# Patient Record
Sex: FEMALE | Race: BLACK OR AFRICAN AMERICAN
[De-identification: names, ages, dates, MRNs, and addresses within clinical notes are randomized per-mention and may not be internally consistent; named-entity substitution may affect disease eponyms.]

---

## 2018-08-18 ENCOUNTER — HOSPITAL ENCOUNTER (INPATIENT)
Dept: HOSPITAL 50 - VM.ED | Age: 73
LOS: 2 days | Discharge: HOME HEALTH | DRG: 293 | End: 2018-08-20
Attending: INTERNAL MEDICINE | Admitting: INTERNAL MEDICINE
Payer: MEDICAID

## 2018-08-18 DIAGNOSIS — I50.9: Primary | ICD-10-CM

## 2018-08-18 DIAGNOSIS — I34.0: ICD-10-CM

## 2018-08-18 DIAGNOSIS — F32.9: ICD-10-CM

## 2018-08-18 DIAGNOSIS — Z88.8: ICD-10-CM

## 2018-08-18 DIAGNOSIS — J84.10: ICD-10-CM

## 2018-08-18 DIAGNOSIS — I11.0: ICD-10-CM

## 2018-08-18 DIAGNOSIS — Z79.899: ICD-10-CM

## 2018-08-18 DIAGNOSIS — E87.6: ICD-10-CM

## 2018-08-18 DIAGNOSIS — G93.89: ICD-10-CM

## 2018-08-18 DIAGNOSIS — Z60.3: ICD-10-CM

## 2018-08-18 DIAGNOSIS — D56.3: ICD-10-CM

## 2018-08-18 DIAGNOSIS — E83.52: ICD-10-CM

## 2018-08-18 DIAGNOSIS — I45.10: ICD-10-CM

## 2018-08-18 DIAGNOSIS — I50.23: ICD-10-CM

## 2018-08-18 DIAGNOSIS — R26.9: ICD-10-CM

## 2018-08-18 DIAGNOSIS — M17.0: ICD-10-CM

## 2018-08-18 DIAGNOSIS — Z79.82: ICD-10-CM

## 2018-08-18 DIAGNOSIS — R76.8: ICD-10-CM

## 2018-08-18 LAB
ANION GAP SERPL CALC-SCNC: 13.7 MMOL/L (ref 10–20)
CHLORIDE SERPL-SCNC: 104 MMOL/L (ref 98–107)
SODIUM SERPL-SCNC: 140 MMOL/L (ref 136–145)

## 2018-08-18 RX ADMIN — Medication SCH MG: at 21:16

## 2018-08-18 RX ADMIN — Medication PRN ML: at 20:11

## 2018-08-18 RX ADMIN — Medication PRN ML: at 17:16

## 2018-08-18 SDOH — SOCIAL STABILITY - SOCIAL INSECURITY: ACCULTURATION DIFFICULTY: Z60.3

## 2018-08-18 NOTE — EDM.PDOC
ED HPI GENERAL MEDICAL PROBLEM





- General


Stated Complaint: WEAKNESS


Time Seen by Provider: 08/18/18 12:15


Source of Information: Reports: Patient


History Limitations: Reports: No Limitations





- History of Present Illness


INITIAL COMMENTS - FREE TEXT/NARRATIVE: 





Patient comes into the emergency department with family with complaints of 

weakness bilateral lower extremity. Pt does not speak English-granddaughter is 

here interpreting. Did try to obtain interpretative services over the phone and 

via [facility] however unsuccessful for the patients [place] language and they 

do not have any registered providers. The grandmother states the patient was 

ambulating on her own yesterday however today she's been unable to ambulate. 

When patient tries to stand she begins to shake excessively on her lower 

extremities and cannot move forward. The grand daughter has noticed she has not 

b been drinking as much over the past couple days but the pt has not mentioned 

any reason why. Family/patient denies her falling or being injured in any way. 

She denies any fever, dizziness, lightheadedness, blurred vision, chest pain, 

or shortness of breath. She also denies any recent travel or numbness and 

tingling.  


Onset: Sudden, Gradual


Improves with: Reports: None


Worsens with: Reports: None





- Related Data


 Allergies











Allergy/AdvReac Type Severity Reaction Status Date / Time


 


lisinopril Allergy  Cannot Verified 08/18/18 13:01





   Remember  











Home Meds: 


 Home Meds





ARIPiprazole [Abilify] 5 mg PO DAILY 08/18/18 [History]


Aspirin [Halfprin] 81 mg PO DAILY 08/18/18 [History]


Carvedilol 6.25 mg PO BID 08/18/18 [History]


Cholecalciferol (Vitamin D3) [Vitamin D3] 1 tab PO DAILY 08/18/18 [History]


Citalopram [Citalopram HBr] 1 tab PO DAILY 08/18/18 [History]


Furosemide 20 mg PO DAILY 08/18/18 [History]


Losartan Potassium 50 mg PO DAILY 08/18/18 [History]


Multivitamin [Multivitamins] 1 caplet PO DAILY 08/18/18 [History]


Nintedanib Esylate [Ofev] 150 mg PO BID 08/18/18 [History]


Olopatadine HCl [Pazeo] 1 drop EYEBOTH DAILY 08/18/18 [History]


Potassium Chloride 1 tab PO DAILY 08/18/18 [History]











ED ROS GENERAL





- Review of Systems


Review Of Systems: See Below


Constitutional: Reports: Weakness.  Denies: Fever, Chills, Malaise, Fatigue, 

Night Sweats, Diaphoresis, Decreased Appetite, Weight Loss, Weight Gain


HEENT: Reports: No Symptoms


Respiratory: Reports: No Symptoms


Cardiovascular: Reports: No Symptoms


Endocrine: Reports: No Symptoms


GI/Abdominal: Reports: No Symptoms


: Reports: No Symptoms


Musculoskeletal: Reports: No Symptoms


Skin: Reports: No Symptoms


Neurological: Reports: No Symptoms


Psychiatric: Reports: No Symptoms


Hematologic/Lymphatic: Reports: No Symptoms


Immunologic: Reports: No Symptoms





ED EXAM, GENERAL





- Physical Exam


Exam: See Below


Exam Limited By: Language Barrier


General Appearance: Alert, No Apparent Distress


Eye Exam: Bilateral Eye: PERRL


Head: Atraumatic, Normocephalic


Neck: Normal Inspection, Supple, Non-Tender, Full Range of Motion


Respiratory/Chest: No Respiratory Distress, Lungs Clear, Normal Breath Sounds, 

No Accessory Muscle Use, Chest Non-Tender


Cardiovascular: Normal Peripheral Pulses, Regular Rate, Rhythm, No Edema


Back Exam: Normal Inspection, Full Range of Motion


Extremities: Normal Inspection, Normal Range of Motion, Non-Tender, No Pedal 

Edema.  No: Increased Warmth, Mottled, Pallor, Redness


Neurological: Alert, Abnormal Gait, Other (Pt unable to understand sensory 

motor exam).  No: Normal Gait


Psychiatric: Flat Affect (pt shows no sign of emotion or facial flucuation when 

talking with granddaughter)


Skin Exam: Warm, Dry, Normal Color





EKG INTERPRETATION


EKG Date: 08/18/18


Rhythm: NSR (with PVC)


ST-T: Normal


QT: Normal


Comparison: NA - No Prior EKG





Course





- Vital Signs


Last Recorded V/S: 


 Last Vital Signs











Temp  37.0 C   08/18/18 12:15


 


Pulse  87   08/18/18 12:15


 


Resp  16   08/18/18 12:15


 


BP  145/97 H  08/18/18 12:15


 


Pulse Ox  95   08/18/18 12:15














- Orders/Labs/Meds


Orders: 


 Active Orders 24 hr











 Category Date Time Status


 


 Admission Status [Patient Status] [ADT] Routine ADT  08/18/18 14:24 Active


 


 EKG Documentation Completion [RC] STAT Care  08/18/18 12:27 Active


 


 Chest 1V Frontal [CR] Stat Exams  08/18/18 13:45 Taken


 


 Knee 1V or 2V Bi [CR] Stat Exams  08/18/18 12:37 Taken


 


 Pelvis 1V or 2V [CR] Stat Exams  08/18/18 12:37 Taken


 


 UA W/MICROSCOPIC [URIN] Stat Lab  08/18/18 13:04 Ordered


 


 Sodium Chloride 0.9% [Saline Flush] Med  08/18/18 13:10 Active





 10 ml FLUSH ASDIRECTED PRN   


 


 Peripheral IV Insertion Adult [OM.PC] Stat Oth  08/18/18 13:09 Ordered








 Medication Orders





Sodium Chloride (Saline Flush)  10 ml FLUSH ASDIRECTED PRN


   PRN Reason: Keep Vein Open








Labs: 


 Laboratory Tests











  08/18/18 08/18/18 08/18/18 Range/Units





  12:42 12:42 12:42 


 


WBC  7.5    (4.0-10.0)  x10^3/uL


 


RBC  5.84 H    (4.00-5.50)  x10^6/uL


 


Hgb  12.7    (12.0-16.0)  g/dL


 


Hct  36.6    (33.0-47.0)  %


 


MCV  62.7 L    (78.0-93.0)  fL


 


MCH  21.7 L    (26.0-32.0)  pg


 


MCHC  34.7    (32.0-36.0)  g/dL


 


RDW Coeff of Mary  19.6 H    (10.0-15.0)  %


 


Plt Count  228    (130-400)  x10^3/uL


 


Neut % (Auto)  63.7    (50.0-80.0)  %


 


Lymph % (Auto)  25.9    (25.0-50.0)  %


 


Mono % (Auto)  8.6    (2.0-11.0)  %


 


Eos % (Auto)  1.7    (0.0-4.0)  %


 


Baso % (Auto)  0.1 L    (0.2-1.2)  %


 


Sodium   140   (136-145)  mmol/L


 


Potassium   3.7   (3.5-5.1)  mmol/L


 


Chloride   104   ()  mmol/L


 


Carbon Dioxide   26   (21-32)  mmol/L


 


Anion Gap   13.7   (10-20)  mmol/L


 


BUN   27 H   (7-18)  mg/dL


 


Creatinine   1.0   (0.55-1.02)  mg/dL


 


Est Cr Clr Drug Dosing   35.45   mL/min


 


Estimated GFR (MDRD)   54   


 


Glucose   111 H   ()  mg/dL


 


Calcium   9.7   (8.5-10.1)  mg/dL


 


Corrected Calcium   10.18 H   (8.5-10.1)  mg/dL


 


Total Bilirubin   0.7   (0.2-1.0)  mg/dL


 


AST   22   (15-37)  U/L


 


ALT   16   (14-59)  U/L


 


Alkaline Phosphatase   73   ()  U/L


 


Creatine Kinase    167  ()  U/L


 


NT-Pro-B Natriuret Pep   2905 H   (<=125)  pg/mL


 


Total Protein   9.0 H   (6.4-8.2)  g/dL


 


Albumin   3.4   (3.4-5.0)  g/dL


 


Globulin   5.6   


 


Albumin/Globulin Ratio   0.61   


 


Urine Color     (YELLOW)  


 


Urine Appearance     (CLEAR)  


 


Urine pH     (5.0-8.0)  


 


Ur Specific Gravity     


 


Urine Protein     (NEGATIVE)  mg/dL


 


Urine Glucose (UA)     (NEGATIVE)  mg/dL


 


Urine Ketones     (NEGATIVE)  mg/dL


 


Urine Occult Blood     (NEGATIVE)  


 


Urine Nitrite     (NEGATIVE)  


 


Urine Bilirubin     (NEGATIVE)  


 


Urine Urobilinogen     (0.2)  EU/dL


 


Ur Leukocyte Esterase     (NEGATIVE)  


 


Urine RBC     (NOT SEEN)  /HPF


 


Urine WBC     (NOT SEEN)  /HPF


 


Ur Squamous Epith Cells     (NEGATIVE)  /HPF


 


Urine Bacteria     (NEGATIVE)  /HPF


 


Urine Mucus     (NEGATIVE)  /LPF














  08/18/18 Range/Units





  13:04 


 


WBC   (4.0-10.0)  x10^3/uL


 


RBC   (4.00-5.50)  x10^6/uL


 


Hgb   (12.0-16.0)  g/dL


 


Hct   (33.0-47.0)  %


 


MCV   (78.0-93.0)  fL


 


MCH   (26.0-32.0)  pg


 


MCHC   (32.0-36.0)  g/dL


 


RDW Coeff of Mary   (10.0-15.0)  %


 


Plt Count   (130-400)  x10^3/uL


 


Neut % (Auto)   (50.0-80.0)  %


 


Lymph % (Auto)   (25.0-50.0)  %


 


Mono % (Auto)   (2.0-11.0)  %


 


Eos % (Auto)   (0.0-4.0)  %


 


Baso % (Auto)   (0.2-1.2)  %


 


Sodium   (136-145)  mmol/L


 


Potassium   (3.5-5.1)  mmol/L


 


Chloride   ()  mmol/L


 


Carbon Dioxide   (21-32)  mmol/L


 


Anion Gap   (10-20)  mmol/L


 


BUN   (7-18)  mg/dL


 


Creatinine   (0.55-1.02)  mg/dL


 


Est Cr Clr Drug Dosing   mL/min


 


Estimated GFR (MDRD)   


 


Glucose   ()  mg/dL


 


Calcium   (8.5-10.1)  mg/dL


 


Corrected Calcium   (8.5-10.1)  mg/dL


 


Total Bilirubin   (0.2-1.0)  mg/dL


 


AST   (15-37)  U/L


 


ALT   (14-59)  U/L


 


Alkaline Phosphatase   ()  U/L


 


Creatine Kinase   ()  U/L


 


NT-Pro-B Natriuret Pep   (<=125)  pg/mL


 


Total Protein   (6.4-8.2)  g/dL


 


Albumin   (3.4-5.0)  g/dL


 


Globulin   


 


Albumin/Globulin Ratio   


 


Urine Color  Yellow  (YELLOW)  


 


Urine Appearance  Clear  (CLEAR)  


 


Urine pH  7.0  (5.0-8.0)  


 


Ur Specific Gravity  1.020  


 


Urine Protein  >=300 H  (NEGATIVE)  mg/dL


 


Urine Glucose (UA)  Negative  (NEGATIVE)  mg/dL


 


Urine Ketones  Negative  (NEGATIVE)  mg/dL


 


Urine Occult Blood  Trace-intact H  (NEGATIVE)  


 


Urine Nitrite  Negative  (NEGATIVE)  


 


Urine Bilirubin  Negative  (NEGATIVE)  


 


Urine Urobilinogen  0.2  (0.2)  EU/dL


 


Ur Leukocyte Esterase  Trace H  (NEGATIVE)  


 


Urine RBC  0-5  (NOT SEEN)  /HPF


 


Urine WBC  0-5  (NOT SEEN)  /HPF


 


Ur Squamous Epith Cells  Few H  (NEGATIVE)  /HPF


 


Urine Bacteria  Not seen  (NEGATIVE)  /HPF


 


Urine Mucus  Not seen  (NEGATIVE)  /LPF











Meds: 


Medications











Generic Name Dose Route Start Last Admin





  Trade Name Freq  PRN Reason Stop Dose Admin


 


Sodium Chloride  10 ml  08/18/18 13:10  





  Saline Flush  FLUSH   





  ASDIRECTED PRN   





  Keep Vein Open   





     





     





     














Discontinued Medications














Generic Name Dose Route Start Last Admin





  Trade Name Freq  PRN Reason Stop Dose Admin


 


Sodium Chloride  1,000 mls @ 1,000 mls/hr  08/18/18 13:10  08/18/18 13:19





  Normal Saline  IV  08/18/18 14:09  1,000 mls/hr





  ONETIME ONE   Administration





     





     





     





     














Departure





- Departure


Time of Disposition: 14:20


Disposition: Admitted As Inpatient 65


Clinical Impression: 


CHF (congestive heart failure)


Qualifiers:


 Heart failure type: unspecified Heart failure chronicity: acute Qualified Code(

s): I50.9 - Heart failure, unspecified








- Discharge Information


Referrals: 


PCP,Not In Area [Primary Care Provider] - 





- My Orders


Last 24 Hours: 


My Active Orders





08/18/18 12:27


EKG Documentation Completion [RC] STAT 





08/18/18 12:37


Knee 1V or 2V Bi [CR] Stat 


Pelvis 1V or 2V [CR] Stat 





08/18/18 13:04


UA W/MICROSCOPIC [URIN] Stat 





08/18/18 13:09


Peripheral IV Insertion Adult [OM.PC] Stat 





08/18/18 13:10


Sodium Chloride 0.9% [Saline Flush]   10 ml FLUSH ASDIRECTED PRN 





08/18/18 13:45


Chest 1V Frontal [CR] Stat 





08/18/18 14:24


Admission Status [Patient Status] [ADT] Routine 














- Assessment/Plan


Last 24 Hours: 


My Active Orders





08/18/18 12:27


EKG Documentation Completion [RC] STAT 





08/18/18 12:37


Knee 1V or 2V Bi [CR] Stat 


Pelvis 1V or 2V [CR] Stat 





08/18/18 13:04


UA W/MICROSCOPIC [URIN] Stat 





08/18/18 13:09


Peripheral IV Insertion Adult [OM.PC] Stat 





08/18/18 13:10


Sodium Chloride 0.9% [Saline Flush]   10 ml FLUSH ASDIRECTED PRN 





08/18/18 13:45


Chest 1V Frontal [CR] Stat 





08/18/18 14:24


Admission Status [Patient Status] [ADT] Routine 











Assessment:: 





1. weakness


2. unable to ambulate on own 


Plan: 





1. labs completed in ER results discussed with the pt and family


2. xray completed in ER results discussed with the pt and family 


3. UA completed in ER 


4. IV with fluids for possible dehydration. After labs were returned rate was 

turned down to 125ml/hr


5. Chest x-ray results discussed with the pt and family 


6. Discussed case with hospitalist Dr. Robert who has agreed to admit pt under 

acute care.

## 2018-08-18 NOTE — PCM.HP
H&P History of Present Illness





- General


Date of Service: 08/18/18


Admit Problem/Dx: 


 Admission Diagnosis/Problem





Admission Diagnosis/Problem      CHF, Congestive heart failure








Source of Information: Patient, Old Records


History Limitations: Reports: Language Barrier





- History of Present Illness


Initial Comments - Free Text/Narative: 





CC Weakness


Pt is 74 year old [place] female who has been visiting her granddaughter in  

for a week. Pt's English is severely limited and hx is obtained through 

Granddaughter's translation. The patient has been living in [place] with her 

daughter. She is at baseline able to walk w/o a walker, do ADL's, cook and 

clean. Granddaughter knows of a hx of depression and some lung problems. Pt was 

in her usual state of health until she was found this afternoon on her hands 

and knees, unable to get up because of weakness. Pt herself says the weakness 

started the night before admision. She denies any pain in legs, back or head, 

numbness or tingling, just that she is weak.


She denies any chess pain or SOB


Pt was found unable to transfer w/o 2 assisting in the  ER while her strength 

in her legs was more or less intact. CXR and BNP were consistent with CHF and 

pt is being admitted for diuresis and observation and treatment of her weakness.


Other HPI/Comments: 





problem2: weakness





- Related Data


Allergies/Adverse Reactions: 


 Allergies











Allergy/AdvReac Type Severity Reaction Status Date / Time


 


lisinopril Allergy  Cannot Verified 08/18/18 13:01





   Remember  











Home Medications: 


 Home Meds





ARIPiprazole [Abilify] 5 mg PO DAILY 08/18/18 [History]


Aspirin [Halfprin] 81 mg PO DAILY 08/18/18 [History]


Carvedilol 6.25 mg PO BID 08/18/18 [History]


Cholecalciferol (Vitamin D3) [Vitamin D3] 1 tab PO DAILY 08/18/18 [History]


Citalopram [Citalopram HBr] 1 tab PO DAILY 08/18/18 [History]


Furosemide 20 mg PO DAILY 08/18/18 [History]


Losartan Potassium 50 mg PO DAILY 08/18/18 [History]


Multivitamin [Multivitamins] 1 caplet PO DAILY 08/18/18 [History]


Nintedanib Esylate [Ofev] 150 mg PO BID 08/18/18 [History]


Olopatadine HCl [Pazeo] 1 drop EYEBOTH DAILY 08/18/18 [History]


Potassium Chloride 1 tab PO DAILY 08/18/18 [History]











Past Medical History


Cardiovascular History: Reports: Heart Failure (EF 45% with lateral wall 

hypokinesis, mod MR 3/2018), Hypertension


Respiratory History: Reports: Other (See Below) (treated for pulomnary fibrosis 

at [place])


Musculoskeletal History: Reports: RA


Psychiatric History: Reports: Depression (unable to get hx as to why she is on 

abilify)





Social & Family History





- Family History


Family Medical History: Unobtainable





- Tobacco Use


Smoking Status *Q: Never Smoker





- Living Situation & Occupation


Living situation: Reports: with Family


Occupation: Unemployed





H&P Review of Systems





- Review of Systems:


Review Of Systems: See Below


General: Reports: No Symptoms


Pulmonary: Reports: No Symptoms


Cardiovascular: Reports: No Symptoms


Gastrointestinal: Reports: No Symptoms


Genitourinary: Reports: No Symptoms


Musculoskeletal: Reports: No Symptoms


Psychiatric: Reports: No Symptoms


Neurological: Reports: Gait Disturbance





Exam





- Exam


Exam: See Below





- Vital Signs


Vital Signs: 


 Last Vital Signs











Temp  98.5 F   08/18/18 17:39


 


Pulse  80   08/18/18 20:11


 


Resp  20   08/18/18 17:39


 


BP  151/85 H  08/18/18 20:11


 


Pulse Ox  99   08/18/18 17:39











Weight: 124 lb 13 oz





- Exam


General: Alert, Cooperative (limited cooperatio because of language difficulty)


HEENT: Conjunctiva Clear, Mucosa Moist & Pink


Neck: Supple


Lungs: Clear to Auscultation


Cardiovascular: Regular Rate


GI/Abdominal Exam: Normal Bowel Sounds, Soft, Non-Tender


Back Exam: Normal Inspection (no back tenderness)


Extremities: Normal Inspection, No Pedal Edema


Skin: Warm, Dry, Intact


Neurological: Cranial Nerves Intact, Sensation Intact (intact to fine touch, 

after repated coaching, hip, knee and ankle stength appers dunia in tact but pt 

unable to stand on her own and walk even 2 steps )





- Patient Data


Lab Results Last 24 hrs: 


 Laboratory Results - last 24 hr











  08/18/18 08/18/18 08/18/18 Range/Units





  12:42 12:42 12:42 


 


WBC  7.5    (4.0-10.0)  x10^3/uL


 


RBC  5.84 H    (4.00-5.50)  x10^6/uL


 


Hgb  12.7    (12.0-16.0)  g/dL


 


Hct  36.6    (33.0-47.0)  %


 


MCV  62.7 L    (78.0-93.0)  fL


 


MCH  21.7 L    (26.0-32.0)  pg


 


MCHC  34.7    (32.0-36.0)  g/dL


 


RDW Coeff of Mary  19.6 H    (10.0-15.0)  %


 


Plt Count  228    (130-400)  x10^3/uL


 


Neut % (Auto)  63.7    (50.0-80.0)  %


 


Lymph % (Auto)  25.9    (25.0-50.0)  %


 


Mono % (Auto)  8.6    (2.0-11.0)  %


 


Eos % (Auto)  1.7    (0.0-4.0)  %


 


Baso % (Auto)  0.1 L    (0.2-1.2)  %


 


Sodium   140   (136-145)  mmol/L


 


Potassium   3.7   (3.5-5.1)  mmol/L


 


Chloride   104   ()  mmol/L


 


Carbon Dioxide   26   (21-32)  mmol/L


 


Anion Gap   13.7   (10-20)  mmol/L


 


BUN   27 H   (7-18)  mg/dL


 


Creatinine   1.0   (0.55-1.02)  mg/dL


 


Est Cr Clr Drug Dosing   35.45   mL/min


 


Estimated GFR (MDRD)   54   


 


Glucose   111 H   ()  mg/dL


 


Calcium   9.7   (8.5-10.1)  mg/dL


 


Corrected Calcium   10.18 H   (8.5-10.1)  mg/dL


 


Total Bilirubin   0.7   (0.2-1.0)  mg/dL


 


AST   22   (15-37)  U/L


 


ALT   16   (14-59)  U/L


 


Alkaline Phosphatase   73   ()  U/L


 


Creatine Kinase    167  ()  U/L


 


Troponin I     (<=0.056)  ng/mL


 


NT-Pro-B Natriuret Pep   2905 H   (<=125)  pg/mL


 


Total Protein   9.0 H   (6.4-8.2)  g/dL


 


Albumin   3.4   (3.4-5.0)  g/dL


 


Globulin   5.6   


 


Albumin/Globulin Ratio   0.61   


 


Urine Color     (YELLOW)  


 


Urine Appearance     (CLEAR)  


 


Urine pH     (5.0-8.0)  


 


Ur Specific Gravity     


 


Urine Protein     (NEGATIVE)  mg/dL


 


Urine Glucose (UA)     (NEGATIVE)  mg/dL


 


Urine Ketones     (NEGATIVE)  mg/dL


 


Urine Occult Blood     (NEGATIVE)  


 


Urine Nitrite     (NEGATIVE)  


 


Urine Bilirubin     (NEGATIVE)  


 


Urine Urobilinogen     (0.2)  EU/dL


 


Ur Leukocyte Esterase     (NEGATIVE)  


 


Urine RBC     (NOT SEEN)  /HPF


 


Urine WBC     (NOT SEEN)  /HPF


 


Ur Squamous Epith Cells     (NEGATIVE)  /HPF


 


Urine Bacteria     (NEGATIVE)  /HPF


 


Urine Mucus     (NEGATIVE)  /LPF














  08/18/18 08/18/18 Range/Units





  12:42 13:04 


 


WBC    (4.0-10.0)  x10^3/uL


 


RBC    (4.00-5.50)  x10^6/uL


 


Hgb    (12.0-16.0)  g/dL


 


Hct    (33.0-47.0)  %


 


MCV    (78.0-93.0)  fL


 


MCH    (26.0-32.0)  pg


 


MCHC    (32.0-36.0)  g/dL


 


RDW Coeff of Mary    (10.0-15.0)  %


 


Plt Count    (130-400)  x10^3/uL


 


Neut % (Auto)    (50.0-80.0)  %


 


Lymph % (Auto)    (25.0-50.0)  %


 


Mono % (Auto)    (2.0-11.0)  %


 


Eos % (Auto)    (0.0-4.0)  %


 


Baso % (Auto)    (0.2-1.2)  %


 


Sodium    (136-145)  mmol/L


 


Potassium    (3.5-5.1)  mmol/L


 


Chloride    ()  mmol/L


 


Carbon Dioxide    (21-32)  mmol/L


 


Anion Gap    (10-20)  mmol/L


 


BUN    (7-18)  mg/dL


 


Creatinine    (0.55-1.02)  mg/dL


 


Est Cr Clr Drug Dosing    mL/min


 


Estimated GFR (MDRD)    


 


Glucose    ()  mg/dL


 


Calcium    (8.5-10.1)  mg/dL


 


Corrected Calcium    (8.5-10.1)  mg/dL


 


Total Bilirubin    (0.2-1.0)  mg/dL


 


AST    (15-37)  U/L


 


ALT    (14-59)  U/L


 


Alkaline Phosphatase    ()  U/L


 


Creatine Kinase    ()  U/L


 


Troponin I  < 0.017   (<=0.056)  ng/mL


 


NT-Pro-B Natriuret Pep    (<=125)  pg/mL


 


Total Protein    (6.4-8.2)  g/dL


 


Albumin    (3.4-5.0)  g/dL


 


Globulin    


 


Albumin/Globulin Ratio    


 


Urine Color   Yellow  (YELLOW)  


 


Urine Appearance   Clear  (CLEAR)  


 


Urine pH   7.0  (5.0-8.0)  


 


Ur Specific Gravity   1.020  


 


Urine Protein   >=300 H  (NEGATIVE)  mg/dL


 


Urine Glucose (UA)   Negative  (NEGATIVE)  mg/dL


 


Urine Ketones   Negative  (NEGATIVE)  mg/dL


 


Urine Occult Blood   Trace-intact H  (NEGATIVE)  


 


Urine Nitrite   Negative  (NEGATIVE)  


 


Urine Bilirubin   Negative  (NEGATIVE)  


 


Urine Urobilinogen   0.2  (0.2)  EU/dL


 


Ur Leukocyte Esterase   Trace H  (NEGATIVE)  


 


Urine RBC   0-5  (NOT SEEN)  /HPF


 


Urine WBC   0-5  (NOT SEEN)  /HPF


 


Ur Squamous Epith Cells   Few H  (NEGATIVE)  /HPF


 


Urine Bacteria   Not seen  (NEGATIVE)  /HPF


 


Urine Mucus   Not seen  (NEGATIVE)  /LPF











Result Diagrams: 


 08/18/18 12:42





 08/18/18 12:42





EKG INTERPRETATION


Rhythm: NSR


Axis: LAD-Left Axis Deviation


QRS: RBBB





- Problem List


(1) CHF (congestive heart failure)


SNOMED Code(s): 17379679


   ICD Code: I50.9 - HEART FAILURE, UNSPECIFIED   Status: Acute   Current Visit

: Yes   


Qualifiers: 


   Heart failure type: unspecified   Heart failure chronicity: acute on chronic

   Qualified Code(s): I50.9 - Heart failure, unspecified   


Problem List Initiated/Reviewed/Updated: Yes


Orders Last 24hrs: 


 Active Orders 24 hr











 Category Date Time Status


 


 Patient Status [ADT] Routine ADT  08/18/18 14:51 Active


 


 Cardiac Monitoring [RC] 02,06,10,14,18,22 Care  08/18/18 14:52 Active


 


 Communication Order [RC] ROUTINE Care  08/18/18 15:05 Active


 


 EKG Documentation Completion [RC] STAT Care  08/18/18 12:27 Active


 


 Height and Weight [RC] 07 Care  08/18/18 14:51 Active


 


 Intake and Output [RC] 06,18 Care  08/18/18 14:52 Active


 


 Oxygen Therapy [RC] .PRN Care  08/18/18 14:51 Active


 


 Up With Assistance [RC] 08,20 Care  08/18/18 14:51 Active


 


 VTE/DVT Education [RC] .PRN Care  08/18/18 14:51 Active


 


 Vital Signs [RC] 02,06,10,14,18,22 Care  08/18/18 14:51 Active


 


 2 Gram Sodium Diet [DIET] Diet  08/18/18 Dinner Active


 


 Chest 1V Frontal [CR] Stat Exams  08/18/18 13:45 Taken


 


 Head wo Cont [CT] Routine Exams  08/18/18 15:02 Taken


 


 Knee 1V or 2V Bi [CR] Stat Exams  08/18/18 12:37 Taken


 


 Pelvis 1V or 2V [CR] Stat Exams  08/18/18 12:37 Taken


 


 BASIC METABOLIC PANEL,BMP [CHEM] AM Lab  08/19/18 05:11 Ordered


 


 COMPREHENSIVE METABOLIC PN,CMP [CHEM] Routine Lab  08/19/18 05:11 Ordered


 


 MAGNESIUM [CHEM] AM Lab  08/19/18 05:11 Ordered


 


 TROPONIN I [CHEM] AM Lab  08/19/18 05:11 Ordered


 


 UA W/MICROSCOPIC [URIN] Stat Lab  08/18/18 13:04 Ordered


 


 ARIPiprazole [Abilify] Med  08/19/18 08:00 Pending





 5 mg PO DAILY   


 


 Aspirin [Halfprin] Med  08/19/18 08:00 Active





 81 mg PO DAILY   


 


 Carvedilol [Coreg] Med  08/18/18 20:00 Active





 6.25 mg PO BID   


 


 Cholecalciferol (Vitamin D3) [Vitamin D3] Med  08/19/18 08:00 Active





 1,000 units PO DAILY   


 


 Citalopram [Celexa] Med  08/19/18 08:00 Active





 20 mg PO DAILY   


 


 Enoxaparin [Lovenox] Med  08/19/18 08:00 Active





 40 mg SUBCUT DAILY   


 


 Furosemide [Lasix] Med  08/18/18 16:30 Active





 20 mg IV TID   


 


 Losartan [Cozaar] Med  08/19/18 08:00 Active





 50 mg PO DAILY   


 


 Multivitamins with Zinc [Stress Formula with Zinc] Med  08/19/18 08:00 Active





 1 tab PO DAILY   


 


 Nintedanib Esylate [Ofev] Med  08/18/18 20:00 Active





 150 mg PO BID   


 


 Olopatadine HCl [Pazeo] Med  08/19/18 08:00 Pending





 1 drop EYEBOTH DAILY   


 


 Potassium Chloride [Klor-Con 10] Med  08/19/18 08:00 Active





 10 meq PO DAILY   


 


 Sodium Chloride 0.9% [Saline Flush] Med  08/18/18 13:10 Active





 10 ml FLUSH ASDIRECTED PRN   


 


 Sodium Chloride 0.9% [Saline Flush] Med  08/18/18 14:51 Active





 10 ml FLUSH ASDIRECTED PRN   


 


 Peripheral IV Insertion Adult [OM.PC] Stat Oth  08/18/18 13:09 Ordered


 


 Saline Lock Insert [OM.PC] Routine Oth  08/18/18 14:51 Ordered


 


 Resuscitation Status Routine Resus Stat  08/18/18 14:51 Ordered








 Medication Orders





Aspirin (Halfprin)  81 mg PO DAILY [facility]


Carvedilol (Coreg)  6.25 mg PO BID [facility]


   Last Admin: 08/18/18 20:11  Dose: 6.25 mg


Cholecalciferol (Vitamin D3)  1,000 units PO DAILY [facility]


Citalopram Hydrobromide (Celexa)  20 mg PO DAILY [facility]


Enoxaparin Sodium (Lovenox)  40 mg SUBCUT DAILY [facility]


Furosemide (Lasix)  20 mg IV TID [facility]


   Last Admin: 08/18/18 20:11  Dose: 20 mg


   Admin: 08/18/18 17:09  Dose: 20 mg


Losartan Potassium (Cozaar)  50 mg PO DAILY [facility]


Non-Formulary Medication (Aripiprazole [Abilify])  5 mg PO DAILY [facility]


Pt Own Med** ( Nintedanib Esylate [ Ofev] 150 Mg)  150 mg PO BID [facility]


Non-Formulary Medication (Olopatadine Hcl [Pazeo])  1 drop EYEBOTH DAILY [facility]


Potassium Chloride (Klor-Con 10)  10 meq PO DAILY [facility]


Sodium Chloride (Saline Flush)  10 ml FLUSH ASDIRECTED PRN


   PRN Reason: Keep Vein Open


   Last Admin: 08/18/18 20:11  Dose: 10 ml


   Admin: 08/18/18 17:16  Dose: 10 ml


Sodium Chloride (Saline Flush)  10 ml FLUSH ASDIRECTED PRN


   PRN Reason: Keep Vein Open


Vitamin B Complex/Vit C/Vit E/Zinc (Stress Formula With Zinc)  1 tab PO DAILY 

[facility]








Assessment/Plan Comment:: 





BNP has never been as high as present level. Will diures with Iv Lasix amd 

repeat BNP.


problem 2: weakness- etiology of this is unclear, maybe related to CHF


Plain x rays of hip, knees and pelvis neg for acute injury


Cat head shows 12 calcification in an area of encephalomalacia left frontal lobe

, mild to moderate generalized atrophy and chronic small vessel disease. If she 

fails to improve MRI mayb e worthwhile. Also of note is mildly elevated calcium 

and protein, will repeat n AM and if abnormal will need up for hypercalcemia 

and Multiple myeloma


3. Microcytosis: this has been seen previously, may be a thalassemia trait, 

Will hold off on workup since hb is normal, get records from [facility]


4. Depression: continue antidepressant, get records re abilify


5. Pulm Fibrosis: continue present medication, cxr not helpful to determine 

level of CHF.

## 2018-08-19 LAB
ANION GAP SERPL CALC-SCNC: 13.4 MMOL/L (ref 10–20)
CHLORIDE SERPL-SCNC: 105 MMOL/L (ref 98–107)
SODIUM SERPL-SCNC: 142 MMOL/L (ref 136–145)

## 2018-08-19 RX ADMIN — Medication SCH MG: at 08:12

## 2018-08-19 RX ADMIN — Medication PRN ML: at 21:06

## 2018-08-19 RX ADMIN — Medication SCH MG: at 21:03

## 2018-08-19 RX ADMIN — POTASSIUM CHLORIDE SCH MEQ: 750 TABLET, FILM COATED, EXTENDED RELEASE ORAL at 08:11

## 2018-08-19 RX ADMIN — VITAMIN D, TAB 1000IU (100/BT) SCH UNITS: 25 TAB at 08:10

## 2018-08-19 RX ADMIN — ZINC SCH TAB: TAB ORAL at 08:10

## 2018-08-19 NOTE — PCM.PN
- General Info


Date of Service: 08/19/18


Subjective Update: 





Pt unable to voice any complaint because of language barrier but answers ok to 

"How are you today"





- Patient Data


Vitals - Most Recent: 


 Last Vital Signs











Temp  97.0 F   08/19/18 09:38


 


Pulse  75   08/19/18 09:38


 


Resp  16   08/19/18 09:38


 


BP  130/93 H  08/19/18 09:38


 


Pulse Ox  100   08/19/18 09:38











Weight - Most Recent: 123 lb 6.4 oz


I&O - Last 24 Hours: 


 Intake & Output











 08/18/18 08/19/18 08/19/18





 22:59 06:59 14:59


 


Intake Total 200 600 


 


Output Total 1150 1800 


 


Balance -950 -1200 











Lab Results Last 24 Hours: 


 Laboratory Results - last 24 hr











  08/18/18 08/18/18 08/18/18 Range/Units





  12:42 12:42 12:42 


 


WBC  7.5    (4.0-10.0)  x10^3/uL


 


RBC  5.84 H    (4.00-5.50)  x10^6/uL


 


Hgb  12.7    (12.0-16.0)  g/dL


 


Hct  36.6    (33.0-47.0)  %


 


MCV  62.7 L    (78.0-93.0)  fL


 


MCH  21.7 L    (26.0-32.0)  pg


 


MCHC  34.7    (32.0-36.0)  g/dL


 


RDW Coeff of Mary  19.6 H    (10.0-15.0)  %


 


Plt Count  228    (130-400)  x10^3/uL


 


Neut % (Auto)  63.7    (50.0-80.0)  %


 


Lymph % (Auto)  25.9    (25.0-50.0)  %


 


Mono % (Auto)  8.6    (2.0-11.0)  %


 


Eos % (Auto)  1.7    (0.0-4.0)  %


 


Baso % (Auto)  0.1 L    (0.2-1.2)  %


 


Sodium   140   (136-145)  mmol/L


 


Potassium   3.7   (3.5-5.1)  mmol/L


 


Chloride   104   ()  mmol/L


 


Carbon Dioxide   26   (21-32)  mmol/L


 


Anion Gap   13.7   (10-20)  mmol/L


 


BUN   27 H   (7-18)  mg/dL


 


Creatinine   1.0   (0.55-1.02)  mg/dL


 


Est Cr Clr Drug Dosing   35.45   mL/min


 


Estimated GFR (MDRD)   54   


 


Glucose   111 H   ()  mg/dL


 


Calcium   9.7   (8.5-10.1)  mg/dL


 


Corrected Calcium   10.18 H   (8.5-10.1)  mg/dL


 


Magnesium     (1.8-2.4)  mg/dL


 


Total Bilirubin   0.7   (0.2-1.0)  mg/dL


 


AST   22   (15-37)  U/L


 


ALT   16   (14-59)  U/L


 


Alkaline Phosphatase   73   ()  U/L


 


Creatine Kinase    167  ()  U/L


 


Troponin I     (<=0.056)  ng/mL


 


NT-Pro-B Natriuret Pep   2905 H   (<=125)  pg/mL


 


Total Protein   9.0 H   (6.4-8.2)  g/dL


 


Albumin   3.4   (3.4-5.0)  g/dL


 


Globulin   5.6   


 


Albumin/Globulin Ratio   0.61   


 


Urine Color     (YELLOW)  


 


Urine Appearance     (CLEAR)  


 


Urine pH     (5.0-8.0)  


 


Ur Specific Gravity     


 


Urine Protein     (NEGATIVE)  mg/dL


 


Urine Glucose (UA)     (NEGATIVE)  mg/dL


 


Urine Ketones     (NEGATIVE)  mg/dL


 


Urine Occult Blood     (NEGATIVE)  


 


Urine Nitrite     (NEGATIVE)  


 


Urine Bilirubin     (NEGATIVE)  


 


Urine Urobilinogen     (0.2)  EU/dL


 


Ur Leukocyte Esterase     (NEGATIVE)  


 


Urine RBC     (NOT SEEN)  /HPF


 


Urine WBC     (NOT SEEN)  /HPF


 


Ur Squamous Epith Cells     (NEGATIVE)  /HPF


 


Urine Bacteria     (NEGATIVE)  /HPF


 


Urine Mucus     (NEGATIVE)  /LPF














  08/18/18 08/18/18 08/19/18 Range/Units





  12:42 13:04 07:13 


 


WBC     (4.0-10.0)  x10^3/uL


 


RBC     (4.00-5.50)  x10^6/uL


 


Hgb     (12.0-16.0)  g/dL


 


Hct     (33.0-47.0)  %


 


MCV     (78.0-93.0)  fL


 


MCH     (26.0-32.0)  pg


 


MCHC     (32.0-36.0)  g/dL


 


RDW Coeff of Mary     (10.0-15.0)  %


 


Plt Count     (130-400)  x10^3/uL


 


Neut % (Auto)     (50.0-80.0)  %


 


Lymph % (Auto)     (25.0-50.0)  %


 


Mono % (Auto)     (2.0-11.0)  %


 


Eos % (Auto)     (0.0-4.0)  %


 


Baso % (Auto)     (0.2-1.2)  %


 


Sodium    142  (136-145)  mmol/L


 


Potassium    3.4 L  (3.5-5.1)  mmol/L


 


Chloride    105  ()  mmol/L


 


Carbon Dioxide    27  (21-32)  mmol/L


 


Anion Gap    13.4  (10-20)  mmol/L


 


BUN    29 H  (7-18)  mg/dL


 


Creatinine    1.1 H  (0.55-1.02)  mg/dL


 


Est Cr Clr Drug Dosing    32.23  mL/min


 


Estimated GFR (MDRD)    59  


 


Glucose    140 H  ()  mg/dL


 


Calcium    8.9  (8.5-10.1)  mg/dL


 


Corrected Calcium    9.54  (8.5-10.1)  mg/dL


 


Magnesium    1.9  (1.8-2.4)  mg/dL


 


Total Bilirubin    0.7  (0.2-1.0)  mg/dL


 


AST    35  (15-37)  U/L


 


ALT    20  (14-59)  U/L


 


Alkaline Phosphatase    68  ()  U/L


 


Creatine Kinase     ()  U/L


 


Troponin I  < 0.017   0.028  (<=0.056)  ng/mL


 


NT-Pro-B Natriuret Pep     (<=125)  pg/mL


 


Total Protein    8.5 H  (6.4-8.2)  g/dL


 


Albumin    3.2 L  (3.4-5.0)  g/dL


 


Globulin    5.3  


 


Albumin/Globulin Ratio    0.60  


 


Urine Color   Yellow   (YELLOW)  


 


Urine Appearance   Clear   (CLEAR)  


 


Urine pH   7.0   (5.0-8.0)  


 


Ur Specific Gravity   1.020   


 


Urine Protein   >=300 H   (NEGATIVE)  mg/dL


 


Urine Glucose (UA)   Negative   (NEGATIVE)  mg/dL


 


Urine Ketones   Negative   (NEGATIVE)  mg/dL


 


Urine Occult Blood   Trace-intact H   (NEGATIVE)  


 


Urine Nitrite   Negative   (NEGATIVE)  


 


Urine Bilirubin   Negative   (NEGATIVE)  


 


Urine Urobilinogen   0.2   (0.2)  EU/dL


 


Ur Leukocyte Esterase   Trace H   (NEGATIVE)  


 


Urine RBC   0-5   (NOT SEEN)  /HPF


 


Urine WBC   0-5   (NOT SEEN)  /HPF


 


Ur Squamous Epith Cells   Few H   (NEGATIVE)  /HPF


 


Urine Bacteria   Not seen   (NEGATIVE)  /HPF


 


Urine Mucus   Not seen   (NEGATIVE)  /LPF














  08/19/18 Range/Units





  07:13 


 


WBC   (4.0-10.0)  x10^3/uL


 


RBC   (4.00-5.50)  x10^6/uL


 


Hgb   (12.0-16.0)  g/dL


 


Hct   (33.0-47.0)  %


 


MCV   (78.0-93.0)  fL


 


MCH   (26.0-32.0)  pg


 


MCHC   (32.0-36.0)  g/dL


 


RDW Coeff of Mary   (10.0-15.0)  %


 


Plt Count   (130-400)  x10^3/uL


 


Neut % (Auto)   (50.0-80.0)  %


 


Lymph % (Auto)   (25.0-50.0)  %


 


Mono % (Auto)   (2.0-11.0)  %


 


Eos % (Auto)   (0.0-4.0)  %


 


Baso % (Auto)   (0.2-1.2)  %


 


Sodium   (136-145)  mmol/L


 


Potassium   (3.5-5.1)  mmol/L


 


Chloride   ()  mmol/L


 


Carbon Dioxide   (21-32)  mmol/L


 


Anion Gap   (10-20)  mmol/L


 


BUN   (7-18)  mg/dL


 


Creatinine   (0.55-1.02)  mg/dL


 


Est Cr Clr Drug Dosing   mL/min


 


Estimated GFR (MDRD)   


 


Glucose   ()  mg/dL


 


Calcium   (8.5-10.1)  mg/dL


 


Corrected Calcium   (8.5-10.1)  mg/dL


 


Magnesium   (1.8-2.4)  mg/dL


 


Total Bilirubin   (0.2-1.0)  mg/dL


 


AST   (15-37)  U/L


 


ALT   (14-59)  U/L


 


Alkaline Phosphatase   ()  U/L


 


Creatine Kinase   ()  U/L


 


Troponin I   (<=0.056)  ng/mL


 


NT-Pro-B Natriuret Pep  2199 H  (<=125)  pg/mL


 


Total Protein   (6.4-8.2)  g/dL


 


Albumin   (3.4-5.0)  g/dL


 


Globulin   


 


Albumin/Globulin Ratio   


 


Urine Color   (YELLOW)  


 


Urine Appearance   (CLEAR)  


 


Urine pH   (5.0-8.0)  


 


Ur Specific Gravity   


 


Urine Protein   (NEGATIVE)  mg/dL


 


Urine Glucose (UA)   (NEGATIVE)  mg/dL


 


Urine Ketones   (NEGATIVE)  mg/dL


 


Urine Occult Blood   (NEGATIVE)  


 


Urine Nitrite   (NEGATIVE)  


 


Urine Bilirubin   (NEGATIVE)  


 


Urine Urobilinogen   (0.2)  EU/dL


 


Ur Leukocyte Esterase   (NEGATIVE)  


 


Urine RBC   (NOT SEEN)  /HPF


 


Urine WBC   (NOT SEEN)  /HPF


 


Ur Squamous Epith Cells   (NEGATIVE)  /HPF


 


Urine Bacteria   (NEGATIVE)  /HPF


 


Urine Mucus   (NEGATIVE)  /LPF











Med Orders - Current: 


 Current Medications





Aspirin (Halfprin)  81 mg PO DAILY [facility]


   Last Admin: 08/19/18 08:10 Dose:  81 mg


Carvedilol (Coreg)  6.25 mg PO BID [facility]


   Last Admin: 08/19/18 08:11 Dose:  6.25 mg


Cholecalciferol (Vitamin D3)  1,000 units PO DAILY [facility]


   Last Admin: 08/19/18 08:10 Dose:  1,000 units


Citalopram Hydrobromide (Celexa)  20 mg PO DAILY [facility]


   Last Admin: 08/19/18 08:11 Dose:  20 mg


Enoxaparin Sodium (Lovenox)  40 mg SUBCUT DAILY [facility]


   Last Admin: 08/19/18 08:11 Dose:  40 mg


Furosemide (Lasix)  20 mg IV TID [facility]


   Last Admin: 08/19/18 08:11 Dose:  20 mg


Losartan Potassium (Cozaar)  50 mg PO DAILY [facility]


   Last Admin: 08/19/18 08:10 Dose:  50 mg


Non-Formulary Medication (Aripiprazole [Abilify])  5 mg PO DAILY [facility]


Pt Own Med** ( Nintedanib Esylate [ Ofev] 150 Mg)  150 mg PO BID [facility]


   Last Admin: 08/19/18 08:12 Dose:  150 mg


Non-Formulary Medication (Olopatadine Hcl [Pazeo])  1 drop EYEBOTH DAILY [facility]


Potassium Chloride (Klor-Con 10)  10 meq PO DAILY [facility]


   Last Admin: 08/19/18 08:11 Dose:  10 meq


Sodium Chloride (Saline Flush)  10 ml FLUSH ASDIRECTED PRN


   PRN Reason: Keep Vein Open


   Last Admin: 08/18/18 20:11 Dose:  10 ml


Sodium Chloride (Saline Flush)  10 ml FLUSH ASDIRECTED PRN


   PRN Reason: Keep Vein Open


Vitamin B Complex/Vit C/Vit E/Zinc (Stress Formula With Zinc)  1 tab PO DAILY 

[facility]


   Last Admin: 08/19/18 08:10 Dose:  1 tab





Discontinued Medications





Sodium Chloride (Normal Saline)  1,000 mls @ 1,000 mls/hr IV ONETIME ONE


   Stop: 08/18/18 14:09


   Last Admin: 08/18/18 13:19 Dose:  1,000 mls/hr











- Exam


General: Alert


Lungs: Rales (1/3 jeanetet)


Cardiovascular: Regular Rate, Murmurs (2/6 mitral)


Extremities: Normal Inspection


Neurological: Other (moving legs outof bed , able to sit up on side of bed w/o 

assistance.)


Psy/Mental Status: Alert (able to stand independently but walks easily with 

walker)





- Problem List & Annotations


(1) CHF (congestive heart failure)


SNOMED Code(s): 97863866


   Code(s): I50.9 - HEART FAILURE, UNSPECIFIED   Status: Acute   Current Visit: 

Yes   


Qualifiers: 


   Heart failure type: unspecified   Heart failure chronicity: acute on chronic

   Qualified Code(s): I50.9 - Heart failure, unspecified   


Annotation/Comment:: bnp improved, weight down 1 lb, continue IV Lasix   





(2) Weakness


SNOMED Code(s): 56985610


   Code(s): R53.1 - WEAKNESS   Status: Acute   Current Visit: Yes   Annotation/

Comment:: Pt's appears stronger today but clearly unable to wlak w/o walker. 

Her [facility] chart makes nomention of walker use; will need to pieter with daughter 

about pt's functioning status, also get [facility] notes.Pt uses 

walker easily, suggesting she has used one int he past. Will place Pt consult. 

  





(3) Hypercalcemia


SNOMED Code(s): 81992366


   Code(s): E83.52 - HYPERCALCEMIA   Status: Acute   Current Visit: Yes   

Annotation/Comment:: resolved   





(4) RBC microcytosis


Status: Acute   Current Visit: Yes   Annotation/Comment:: was seen in [facility] 

labs over the last year, await [facility] labs    





- My Orders


Last 24 Hours: 


My Active Orders





08/18/18 14:51


Patient Status [ADT] Routine 


Height and Weight [RC] 07 


Oxygen Therapy [RC] .PRN 


Up With Assistance [RC] 08,20 


VTE/DVT Education [RC] .PRN 


Vital Signs [RC] 02,06,10,14,18,22 


Sodium Chloride 0.9% [Saline Flush]   10 ml FLUSH ASDIRECTED PRN 


Saline Lock Insert [OM.PC] Routine 


Resuscitation Status Routine 





08/18/18 14:52


Intake and Output [RC] 06,18 08/18/18 15:02


Head wo Cont [CT] Routine 





08/18/18 15:05


Communication Order [RC] ROUTINE 





08/18/18 16:30


Furosemide [Lasix]   20 mg IV TID 





08/18/18 20:00


Carvedilol [Coreg]   6.25 mg PO BID 


Nintedanib Esylate [Ofev]   150 mg PO BID 





08/18/18 Dinner


2 Gram Sodium Diet [DIET] 





08/19/18 08:00


ARIPiprazole [Abilify]   5 mg PO DAILY 


Aspirin [Halfprin]   81 mg PO DAILY 


Cholecalciferol (Vitamin D3) [Vitamin D3]   1,000 units PO DAILY 


Citalopram [Celexa]   20 mg PO DAILY 


Enoxaparin [Lovenox]   40 mg SUBCUT DAILY 


Losartan [Cozaar]   50 mg PO DAILY 


Multivitamins with Zinc [Stress Formula with Zinc]   1 tab PO DAILY 


Olopatadine HCl [Pazeo]   1 drop EYEBOTH DAILY 


Potassium Chloride [Klor-Con 10]   10 meq PO DAILY 














- Plan


Plan:: 





BNP has never been as high as present level. Will diures with Iv Lasix amd 

repeat BNP.


problem 2: weakness- etiology of this is unclear, maybe related to CHF


Plain x rays of hip, knees and pelvis neg for acute injury


Cat head shows 12 calcification in an area of encephalomalacia left frontal lobe

, mild to moderate generalized atrophy and chronic small vessel disease. If she 

fails to improve MRI mayb e worthwhile. Also of note is mildly elevated calcium 

and protein, will repeat n AM and if abnormal will need up for hypercalcemia 

and Multiple myeloma


3. Microcytosis: this has been seen previously, may be a thalassemia trait, 

Will hold off on workup since hb is normal, get records from [facility]


4. Depression: continue antidepressant, get records re abilify


5. Pulm Fibrosis: continue present medication, cxr not helpful to determine 

level of CHF.

## 2018-08-20 LAB
ANION GAP SERPL CALC-SCNC: 13.2 MMOL/L (ref 10–20)
CHLORIDE SERPL-SCNC: 104 MMOL/L (ref 98–107)
SODIUM SERPL-SCNC: 141 MMOL/L (ref 136–145)

## 2018-08-20 RX ADMIN — Medication SCH: at 09:27

## 2018-08-20 RX ADMIN — VITAMIN D, TAB 1000IU (100/BT) SCH UNITS: 25 TAB at 07:59

## 2018-08-20 RX ADMIN — Medication PRN ML: at 08:00

## 2018-08-20 RX ADMIN — Medication SCH: at 11:24

## 2018-08-20 RX ADMIN — Medication SCH: at 09:38

## 2018-08-20 RX ADMIN — POTASSIUM CHLORIDE SCH MEQ: 750 TABLET, FILM COATED, EXTENDED RELEASE ORAL at 07:59

## 2018-08-20 RX ADMIN — ZINC SCH TAB: TAB ORAL at 07:59

## 2018-08-20 RX ADMIN — POTASSIUM CHLORIDE SCH MEQ: 750 TABLET, FILM COATED, EXTENDED RELEASE ORAL at 09:09

## 2018-08-20 RX ADMIN — Medication SCH MG: at 08:00

## 2018-08-20 RX ADMIN — Medication SCH: at 09:30

## 2018-08-20 RX ADMIN — POTASSIUM CHLORIDE SCH: 750 TABLET, FILM COATED, EXTENDED RELEASE ORAL at 12:28

## 2018-08-21 NOTE — DISCH
PRIMARY DISCHARGE DIAGNOSES:

1. Acute-on-chronic systolic heart failure exacerbation with known ejection

    fraction of 40% from 03/2018.  Undergoing further outpatient cardiology

    workup, but has not had her stress test yet.

2. Moderate mitral regurgitation.

3. Underlying pulmonary fibrosis, on treatment since June.

4. Positive rheumatoid factor, but no diagnosis of rheumatoid arthritis.

5. Bilateral knee pain due to osteoarthritis, notedly significant on x-ray,

    especially the left knee, but right knee seems to be worse.

6. Bilateral leg weakness, probably due to heart failure exacerbation.

7. Essential hypertension, on Cozaar.

8. History of depression, on Abilify and Celexa.  She normally follows with

    the [facility].

9. Reported findings on CT scan for encephalomalacia of the left frontal lobe

    with no history of clinical stroke.  Discussed with family.

10.Microcytosis, chronic, without anemia, possibly underlying thalassemia.

11.Hypokalemia due to diuresis, improving.  Our plan is to increase potassium

    on discharge.

12.Mild hypercalcemia on admission, corrected.

 

REASON FOR ADMISSION:  On the date of admission, this 73-year-old female was

found to be down at home, not necessarily fallen and lying there, but just

unable to get back up.  She has arthritis in her knees.  She was brought in.

She was found to have some rales.  She has underlying pulmonary fibrosis.

ProBNP was elevated up to 2900.  Therefore, she was diuresed with IV Lasix 20 mg

3 times a day and did lose 1 pound the first night.  She had high blood pressure

of 180/109 on admission, but this improved with diuresis.  She had no chest

pain.  She had no shortness of breath.  By the time of discharge, she was not

using any oxygen.  She had an EKG that did not show any acute changes.  She had

a discharge weight of 55.79 kg and admitting weight was 56.6.  Family was

present and served as the  as her dialect was too rare to have

somebody through the language line.  Her biggest concern was that her legs still

felt heavy.  The right knee seemed to be worse than the left, but that was felt

to be more arthritis-related than any weakness from a possible stroke.

Potassium was down to 3.2 from diuresis.  I had increased her potassium from 10

mEq daily up to 20 mEq 3 times a day and had discontinued her noon dose of Lasix

at the time of discharge.  She was up and ambulating well with the use of a

walker, which apparently she has never used before.  Family was hopeful that she

would be getting some home health with home PT services and this was also

arranged.

 

PHYSICAL EXAMINATION:

VITAL SIGNS:  Otherwise, at the time of discharge, she had stable vital signs,

which include a weight of 55.7, temperature 98.7, pulse 71, blood pressure

120/66, respiratory rate was 20, and O2 of 96% on room air.

GENERAL:  She is in no acute distress.

HEART:  Regular rate and rhythm.

LUNGS:  Lung sounds are decreased with some rales, possibly due more to

pulmonary fibrosis.  No wheezing.

ABDOMEN:  Nondistended, nontender.

EXTREMITIES:  Warm and dry, no edema.

MENTAL STATUS:  She is alert.  She is polite and cooperative.

 

Otherwise, she had not noted any weight gain or leg swelling prior to this

admission.

 

DISCHARGE PLANS AND INSTRUCTIONS:  She will follow up with Dr. Ng in the

clinic or the [facility] in the next 7 to 14 days.  She will have

a BMP and mag drawn by Home Health next week.  We will increase her Lasix to 20

mg twice daily.  We will increase her potassium to 20 mEq also twice daily.  She

will follow up with lung specialist and heart specialist when she returns to

[place].  I will notify them that she is going to be in [place] for the next

month.  They were actually planning a stress test for her.  She had troponin

repeated x2, both were normal.  She had no chest pain during her stay.  Other

than increasing the Lasix and potassium, no other medication adjustments were

made.  She was also on Lovenox for DVT prophylaxis.  Home health addendum

occurred on 08/20/2018, face-to-face with myself.  I will periodically monitor

this plan of care.  Reason for home health is for teaching and assessments of a

new diagnosis of heart failure exacerbation, for medication monitoring along

with vitals and to assist with drawing labs in a patient who is homebound due to

impaired mobility due to severe arthritis in her knees.  Physical Therapy will

also work with the patient to improve her mobility.  She is unable to leave her

home without the assist of another person.  Absences from home are infrequent

and require taxing effort.

 

Greater than 30 minutes spent on the discharge process.  Her granddaughter did

serve as the  for the reason that her dialect was too rare to be

found on the language line.

 

 

MKA:  08/20/2018 12:14:59  MODL:  08/21/2018 05:37:11

Job #:  [id number]